# Patient Record
Sex: FEMALE | URBAN - METROPOLITAN AREA
[De-identification: names, ages, dates, MRNs, and addresses within clinical notes are randomized per-mention and may not be internally consistent; named-entity substitution may affect disease eponyms.]

---

## 2024-01-15 ENCOUNTER — OFFICE (OUTPATIENT)
Dept: URBAN - METROPOLITAN AREA CLINIC 94 | Facility: CLINIC | Age: 59
Setting detail: OPHTHALMOLOGY
End: 2024-01-15
Payer: COMMERCIAL

## 2024-01-15 DIAGNOSIS — H25.13: ICD-10-CM

## 2024-01-15 DIAGNOSIS — H47.233: ICD-10-CM

## 2024-01-15 PROBLEM — E11.9 DIABETES TYPE 2 NO RETINOPATHY ; BOTH EYES: Status: ACTIVE | Noted: 2024-01-15

## 2024-01-15 PROBLEM — H16.223 DRY EYE SYNDROME K SICCA; BOTH EYES: Status: ACTIVE | Noted: 2024-01-15

## 2024-01-15 PROCEDURE — 92004 COMPRE OPH EXAM NEW PT 1/>: CPT | Performed by: PHYSICIAN ASSISTANT

## 2024-01-15 PROCEDURE — 92250 FUNDUS PHOTOGRAPHY W/I&R: CPT | Performed by: PHYSICIAN ASSISTANT

## 2024-01-15 ASSESSMENT — REFRACTION_CURRENTRX
OD_SPHERE: +3.00
OS_SPHERE: +2.00
OS_CYLINDER: -0.50
OD_AXIS: 102
OD_CYLINDER: -0.50
OS_AXIS: 068
OD_OVR_VA: 20/
OS_OVR_VA: 20/
OS_VPRISM_DIRECTION: PROGS
OD_VPRISM_DIRECTION: PROGS

## 2024-01-15 ASSESSMENT — SUPERFICIAL PUNCTATE KERATITIS (SPK)
OD_SPK: 2+
OS_SPK: 2+

## 2024-01-15 ASSESSMENT — REFRACTION_AUTOREFRACTION
OS_CYLINDER: -1.00
OD_SPHERE: +1.25
OD_CYLINDER: -0.75
OS_AXIS: 081
OD_AXIS: 099
OS_SPHERE: +0.75

## 2024-01-15 ASSESSMENT — SPHEQUIV_DERIVED
OD_SPHEQUIV: 0.875
OS_SPHEQUIV: 0.25

## 2024-01-15 ASSESSMENT — CONFRONTATIONAL VISUAL FIELD TEST (CVF)
OS_FINDINGS: FULL
OD_FINDINGS: FULL

## 2024-02-18 ENCOUNTER — OFFICE (OUTPATIENT)
Dept: URBAN - METROPOLITAN AREA CLINIC 94 | Facility: CLINIC | Age: 59
Setting detail: OPHTHALMOLOGY
End: 2024-02-18
Payer: COMMERCIAL

## 2024-02-18 DIAGNOSIS — H25.13: ICD-10-CM

## 2024-02-18 DIAGNOSIS — E11.9: ICD-10-CM

## 2024-02-18 DIAGNOSIS — H47.233: ICD-10-CM

## 2024-02-18 DIAGNOSIS — H16.223: ICD-10-CM

## 2024-02-18 PROCEDURE — 92012 INTRM OPH EXAM EST PATIENT: CPT | Performed by: PHYSICIAN ASSISTANT

## 2024-02-18 PROCEDURE — 92133 CPTRZD OPH DX IMG PST SGM ON: CPT | Performed by: PHYSICIAN ASSISTANT

## 2024-02-18 PROCEDURE — 76514 ECHO EXAM OF EYE THICKNESS: CPT | Performed by: PHYSICIAN ASSISTANT

## 2024-02-18 ASSESSMENT — REFRACTION_CURRENTRX
OD_AXIS: 102
OD_OVR_VA: 20/
OS_OVR_VA: 20/
OS_VPRISM_DIRECTION: PROGS
OD_VPRISM_DIRECTION: PROGS
OD_CYLINDER: -0.50
OS_SPHERE: +2.00
OS_AXIS: 068
OS_CYLINDER: -0.50
OD_SPHERE: +3.00

## 2024-02-18 ASSESSMENT — REFRACTION_AUTOREFRACTION
OS_AXIS: 082
OS_SPHERE: +0.75
OD_AXIS: 099
OD_CYLINDER: -0.75
OS_CYLINDER: -1.25
OD_SPHERE: +1.00

## 2024-02-18 ASSESSMENT — SPHEQUIV_DERIVED
OS_SPHEQUIV: 0.125
OD_SPHEQUIV: 0.625

## 2024-02-18 ASSESSMENT — CONFRONTATIONAL VISUAL FIELD TEST (CVF)
OS_FINDINGS: FULL
OD_FINDINGS: FULL

## 2024-02-18 ASSESSMENT — SUPERFICIAL PUNCTATE KERATITIS (SPK)
OS_SPK: 2+
OD_SPK: 2+

## 2024-03-30 ENCOUNTER — OFFICE (OUTPATIENT)
Dept: URBAN - METROPOLITAN AREA CLINIC 94 | Facility: CLINIC | Age: 59
Setting detail: OPHTHALMOLOGY
End: 2024-03-30
Payer: COMMERCIAL

## 2024-03-30 DIAGNOSIS — H25.13: ICD-10-CM

## 2024-03-30 DIAGNOSIS — H47.233: ICD-10-CM

## 2024-03-30 DIAGNOSIS — E11.9: ICD-10-CM

## 2024-03-30 PROCEDURE — 92083 EXTENDED VISUAL FIELD XM: CPT | Performed by: OPHTHALMOLOGY

## 2024-03-30 PROCEDURE — 92012 INTRM OPH EXAM EST PATIENT: CPT | Performed by: OPHTHALMOLOGY

## 2024-03-30 ASSESSMENT — REFRACTION_CURRENTRX
OS_SPHERE: +2.00
OD_CYLINDER: -0.50
OD_VPRISM_DIRECTION: PROGS
OS_AXIS: 068
OS_CYLINDER: -0.50
OD_OVR_VA: 20/
OD_AXIS: 102
OS_VPRISM_DIRECTION: PROGS
OD_SPHERE: +3.00
OS_OVR_VA: 20/

## 2024-10-11 ENCOUNTER — EMERGENCY (EMERGENCY)
Facility: HOSPITAL | Age: 59
LOS: 1 days | Discharge: HOME | End: 2024-10-11
Attending: STUDENT IN AN ORGANIZED HEALTH CARE EDUCATION/TRAINING PROGRAM | Admitting: STUDENT IN AN ORGANIZED HEALTH CARE EDUCATION/TRAINING PROGRAM
Payer: COMMERCIAL

## 2024-10-11 VITALS
OXYGEN SATURATION: 98 % | WEIGHT: 134.92 LBS | DIASTOLIC BLOOD PRESSURE: 80 MMHG | HEIGHT: 65 IN | TEMPERATURE: 98 F | HEART RATE: 74 BPM | SYSTOLIC BLOOD PRESSURE: 145 MMHG | RESPIRATION RATE: 16 BRPM

## 2024-10-11 VITALS
OXYGEN SATURATION: 98 % | RESPIRATION RATE: 16 BRPM | DIASTOLIC BLOOD PRESSURE: 80 MMHG | HEART RATE: 74 BPM | SYSTOLIC BLOOD PRESSURE: 130 MMHG

## 2024-10-11 PROCEDURE — 99284 EMERGENCY DEPT VISIT MOD MDM: CPT

## 2024-10-11 PROCEDURE — 71101 X-RAY EXAM UNILAT RIBS/CHEST: CPT

## 2024-10-11 PROCEDURE — 71101 X-RAY EXAM UNILAT RIBS/CHEST: CPT | Mod: 26

## 2024-10-11 PROCEDURE — 99283 EMERGENCY DEPT VISIT LOW MDM: CPT

## 2024-10-11 RX ORDER — LIDOCAINE 50 MG/G
1 CREAM TOPICAL ONCE
Refills: 0 | Status: COMPLETED | OUTPATIENT
Start: 2024-10-11 | End: 2024-10-11

## 2024-10-11 RX ADMIN — Medication 600 MILLIGRAM(S): at 13:50

## 2024-10-11 RX ADMIN — LIDOCAINE 1 PATCH: 50 CREAM TOPICAL at 13:50
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
## 2024-10-11 NOTE — ED PROVIDER NOTE - PHYSICAL EXAMINATION
Gen: Awake, Alert, NAD, well appearing  Head:  NC/AT  Eyes:  PERRL, EOMI, Conjunctiva pink, no scleral icterus  ENT:  no exudates, no erythema, uvula midline, TMs clear bilaterally, moist mucus membranes  Neck: supple, nontender, no meningismus, no JVD, trachea midline  Cardiac/CV:  S1 S2, RRR, no murmurs  Chest: mild tenderness to left lateral ribs without crepitus or ecchymosis  Respiratory/Pulm:  CTAB, good air movement, normal resp effort, no wheezes/stridor/retractions/rales/rhonchi  Gastrointestinal/Abdomen:  Soft, nontender, nondistended, no rebound/guarding  Pelvis: stable, nontender, Hips: FROM, nontender  Back:  no CVAT, no MLT  Ext:  warm, well perfused, moving all extremities spontaneously, no cyanosis, no erythema, no edema, distal pulses intact, small abrasion left upper arm, good AROM of all extremities, normal sensation  Skin: intact, no rash, no petechiae, no ecchymosis  Neuro:  AAOx3, sensation intact, motor 5/5 x 4 extremities, clear speech, normal gait
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
## 2024-10-11 NOTE — ED ADULT NURSE NOTE - CAS DISCH TRANSFER METHOD
Problem: Prexisting or High Potential for Compromised Skin Integrity  Goal: Skin integrity is maintained or improved  Description: INTERVENTIONS:  - Identify patients at risk for skin breakdown  - Assess and monitor skin integrity  - Assess and monitor nutrition and hydration status  - Monitor labs   - Assess for incontinence   - Turn and reposition patient  - Assist with mobility/ambulation  - Relieve pressure over bony prominences  - Avoid friction and shearing  - Provide appropriate hygiene as needed including keeping skin clean and dry  - Evaluate need for skin moisturizer/barrier cream  - Collaborate with interdisciplinary team   - Patient/family teaching  - Consider wound care consult   Outcome: Progressing     Problem: DISCHARGE PLANNING  Goal: Discharge to home or other facility with appropriate resources  Description: INTERVENTIONS:  - Identify barriers to discharge w/patient and caregiver  - Arrange for needed discharge resources and transportation as appropriate  - Identify discharge learning needs (meds, wound care, etc.)  - Arrange for interpretive services to assist at discharge as needed  - Refer to Case Management Department for coordinating discharge planning if the patient needs post-hospital services based on physician/advanced practitioner order or complex needs related to functional status, cognitive ability, or social support system  Outcome: Progressing     Problem: Knowledge Deficit  Goal: Patient/family/caregiver demonstrates understanding of disease process, treatment plan, medications, and discharge instructions  Description: Complete learning assessment and assess knowledge base.  Interventions:  - Provide teaching at level of understanding  - Provide teaching via preferred learning methods  Outcome: Progressing      Private car

## 2024-10-11 NOTE — ED PROVIDER NOTE - PATIENT PORTAL LINK FT
You can access the FollowMyHealth Patient Portal offered by [facility] by registering at the following website: [email or web address]. By joining [facility]’s FollowMyHealth portal, you will also be able to view your health information using other applications (apps) compatible with our system.

## 2024-10-11 NOTE — ED ADULT NURSE NOTE - NSFALLCONCLUSION_ED_ALL_ED
Detail Level: Generalized
Quality 226: Preventive Care And Screening: Tobacco Use: Screening And Cessation Intervention: Patient screened for tobacco use and is an ex/non-smoker
Quality 130: Documentation Of Current Medications In The Medical Record: Current Medications Documented
Fall with Harm Risk

## 2024-10-11 NOTE — ED PROVIDER NOTE - NSFOLLOWUPINSTRUCTIONS_ED_ALL_ED_FT
Please return to the Emergency Department immediately for worsening pain, breathing, chest pain and if you have any other problems or concerns. Please follow up with your Primary Care Physician within the next 2 days.    Please take tylenol and/or ibuprofen every 6 hours as needed for pain, try and take with food. Please stay well hydrated      Please take any prescription medications prescribed as instructed    Please follow up with PCP within the next 2 days as well

## 2024-10-11 NOTE — ED PROVIDER NOTE - CARE PROVIDER_API CALL
Oscar Jarrett  Internal Medicine  [address] [place] [id number]  Phone: [phone]  Fax: [phone]  Follow Up Time:

## 2024-10-11 NOTE — ED ADULT NURSE NOTE - NSFALLHARMRISKINTERV_ED_ALL_ED
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
